# Patient Record
Sex: MALE | Race: WHITE | NOT HISPANIC OR LATINO | URBAN - METROPOLITAN AREA
[De-identification: names, ages, dates, MRNs, and addresses within clinical notes are randomized per-mention and may not be internally consistent; named-entity substitution may affect disease eponyms.]

---

## 2024-12-01 ENCOUNTER — OFFICE VISIT (OUTPATIENT)
Dept: URGENT CARE | Age: 29
End: 2024-12-01
Payer: COMMERCIAL

## 2024-12-01 VITALS
RESPIRATION RATE: 20 BRPM | WEIGHT: 140 LBS | HEART RATE: 60 BPM | OXYGEN SATURATION: 98 % | BODY MASS INDEX: 21.22 KG/M2 | TEMPERATURE: 98.6 F | DIASTOLIC BLOOD PRESSURE: 75 MMHG | SYSTOLIC BLOOD PRESSURE: 113 MMHG | HEIGHT: 68 IN

## 2024-12-01 DIAGNOSIS — S61.012A LACERATION OF LEFT THUMB WITHOUT FOREIGN BODY WITHOUT DAMAGE TO NAIL, INITIAL ENCOUNTER: Primary | ICD-10-CM

## 2024-12-01 ASSESSMENT — PAIN SCALES - GENERAL: PAINLEVEL_OUTOF10: 3

## 2024-12-01 NOTE — PATIENT INSTRUCTIONS
Laceration to the left thumb:  - Wound care discussed  - 3 sutures and 2 steri strips applied  - Pt advised on s/s of infection and when to seek emergent and urgent care again  - Pt to return to the Urgent Care in 7-10 days for suture removal

## 2024-12-01 NOTE — PROGRESS NOTES
"Subjective   Patient ID: Tim Cotter is a 29 y.o. male. They present today with a chief complaint of Injury (Left thumb laceration with knife today).    History of Present Illness  Patient here this evening secondary to cutting the tip of his left thumb with a knife while making dinner. Bleeding is not controlled at this time. Laceration is about 1.5 cm but varies in depth. Normal sensation; cap refill is less than 2 seconds. No other associated symptoms at this time.           Past Medical History  Allergies as of 12/01/2024 - never reviewed   Allergen Reaction Noted    Penicillins Unknown 08/23/2004       (Not in a hospital admission)       No past medical history on file.    No past surgical history on file.     reports that he has never smoked. He has never used smokeless tobacco.    Review of Systems  Review of Systems  10 point ROS completed and all are negative other than what is stated in the current HPI                               Objective    Vitals:    12/01/24 1813   BP: 113/75   BP Location: Left arm   Patient Position: Sitting   Pulse: 60   Resp: 20   Temp: 37 °C (98.6 °F)   TempSrc: Oral   SpO2: 98%   Weight: 63.5 kg (140 lb)   Height: 1.727 m (5' 8\")     No LMP for male patient.    Physical Exam  Vitals and nursing note reviewed.   Constitutional:       Appearance: Normal appearance.   Skin:     General: Skin is warm and dry.      Comments: Laceration to the left thumb, bleeding not controlled at this time (see procedure)  Normal sensation; cap refill <2sec   Neurological:      Mental Status: He is alert.         Laceration Repair    Date/Time: 12/1/2024 8:12 PM    Performed by: LORY Gustafson  Authorized by: LORY Gustafson    Consent:     Consent obtained:  Verbal    Consent given by:  Patient    Risks discussed:  Infection, pain and poor wound healing  Universal protocol:     Patient identity confirmed:  Verbally with patient  Anesthesia:     Anesthesia method:  " Local infiltration    Local anesthetic:  Lidocaine 1% WITH epi  Laceration details:     Location:  Finger    Finger location:  L thumb    Length (cm):  1 (total laceration is about 1.5 cm and curved; no nailbed invovlement laceration starts just below the tip of the nail)  Pre-procedure details:     Preparation:  Patient was prepped and draped in usual sterile fashion  Treatment:     Area cleansed with:  Chlorhexidine, saline and povidone-iodine    Amount of cleaning:  Standard  Skin repair:     Repair method:  Sutures    Suture size:  5-0    Suture material:  Prolene    Suture technique:  Simple interrupted    Number of sutures:  3  Approximation:     Approximation:  Close  Repair type:     Repair type:  Simple  Post-procedure details:     Dressing:  Antibiotic ointment and non-adherent dressing    Procedure completion:  Tolerated      Point of Care Test & Imaging Results from this visit  No results found for this visit on 12/01/24.   No results found.    Diagnostic study results (if any) were reviewed by LORY Gustafson.    Assessment/Plan   Allergies, medications, history, and pertinent labs/EKGs/Imaging reviewed by LORY Gustafson.     Medical Decision Making  Laceration to the left thumb:  - Wound care discussed  - 3 sutures and 2 steri strips applied  - Pt advised on s/s of infection and when to seek emergent and urgent care again  - Pt to return to the Urgent Care in 7-10 days for suture removal    Orders and Diagnoses  Diagnoses and all orders for this visit:  Laceration of left thumb without foreign body without damage to nail, initial encounter  Other orders  -     Tdap vaccine, age 7 years and older  (BOOSTRIX)  -     Laceration Repair      Medical Admin Record      Patient disposition: Home    Electronically signed by LORY Gustafson  8:18 PM

## 2024-12-08 ENCOUNTER — OFFICE VISIT (OUTPATIENT)
Dept: URGENT CARE | Age: 29
End: 2024-12-08
Payer: COMMERCIAL

## 2024-12-08 VITALS
BODY MASS INDEX: 21.29 KG/M2 | OXYGEN SATURATION: 99 % | HEART RATE: 61 BPM | RESPIRATION RATE: 16 BRPM | TEMPERATURE: 97.5 F | DIASTOLIC BLOOD PRESSURE: 70 MMHG | WEIGHT: 140 LBS | SYSTOLIC BLOOD PRESSURE: 108 MMHG

## 2024-12-08 DIAGNOSIS — S61.012D LACERATION OF LEFT THUMB WITHOUT FOREIGN BODY WITHOUT DAMAGE TO NAIL, SUBSEQUENT ENCOUNTER: ICD-10-CM

## 2024-12-08 DIAGNOSIS — Z48.02 VISIT FOR SUTURE REMOVAL: Primary | ICD-10-CM

## 2024-12-08 PROCEDURE — 1036F TOBACCO NON-USER: CPT

## 2024-12-08 PROCEDURE — 99213 OFFICE O/P EST LOW 20 MIN: CPT

## 2024-12-08 ASSESSMENT — ENCOUNTER SYMPTOMS
FEVER: 0
JOINT SWELLING: 0
CHILLS: 0
DIAPHORESIS: 0
WOUND: 1
FATIGUE: 0
COLOR CHANGE: 0

## 2024-12-09 NOTE — PROGRESS NOTES
Subjective   Patient ID: Tim Cotter is a 29 y.o. male. They present today with a chief complaint of Suture / Staple Removal.    History of Present Illness  Subjective  Tim Cotter is a 29 y.o. male who obtained a laceration 7 days ago, which required closure with 3 sutures. Mechanism of injury: knife. He denies pain, redness, or drainage from the wound. His last tetanus was 7 days ago.    Objective  /70   Pulse 61   Temp 36.4 °C (97.5 °F)   Resp 16   Wt 63.5 kg (140 lb)   SpO2 99%   BMI 21.29 kg/m²   Injury exam:  A 1.5 cm laceration noted on the left distal thumb is healing well, without evidence of infection.    Assessment/Plan  Laceration is healing well, without evidence of infection.    1. 3 sutures were removed.  2. Wound care discussed.  3. Follow up as needed.        History provided by:  Patient   used: No    Suture / Staple Removal         Past Medical History  Allergies as of 12/08/2024 - Reviewed 12/08/2024   Allergen Reaction Noted    Penicillins Unknown 08/23/2004       (Not in a hospital admission)       No past medical history on file.    No past surgical history on file.     reports that he has never smoked. He has never used smokeless tobacco.    Review of Systems  Review of Systems   Constitutional:  Negative for chills, diaphoresis, fatigue and fever.   Musculoskeletal:  Negative for joint swelling.   Skin:  Positive for wound. Negative for color change.                                  Objective    Vitals:    12/08/24 1847   BP: 108/70   Pulse: 61   Resp: 16   Temp: 36.4 °C (97.5 °F)   SpO2: 99%   Weight: 63.5 kg (140 lb)     No LMP for male patient.    Physical Exam  Vitals and nursing note reviewed.   Constitutional:       General: He is not in acute distress.     Appearance: Normal appearance. He is normal weight. He is not ill-appearing, toxic-appearing or diaphoretic.   Cardiovascular:      Rate and Rhythm: Normal rate.   Pulmonary:      Effort:  Pulmonary effort is normal.   Skin:     General: Skin is warm and dry.      Capillary Refill: Capillary refill takes less than 2 seconds.      Coloration: Skin is not jaundiced or pale.      Findings: No bruising or erythema.   Neurological:      General: No focal deficit present.      Mental Status: He is alert and oriented to person, place, and time.         Suture Removal    Date/Time: 12/8/2024 7:49 PM    Performed by: LORY Graves  Authorized by: LORY Graves    Consent:     Consent obtained:  Verbal    Consent given by:  Patient    Risks, benefits, and alternatives were discussed: yes      Risks discussed:  Bleeding, pain and wound separation  Universal protocol:     Procedure explained and questions answered to patient or proxy's satisfaction: yes      Patient identity confirmed:  Verbally with patient  Location:     Location:  Upper extremity    Upper extremity location:  Hand    Hand location:  L thumb  Procedure details:     Wound appearance:  No signs of infection, good wound healing and clean    Number of sutures removed:  3  Post-procedure details:     Post-removal:  No dressing applied    Procedure completion:  Tolerated      Point of Care Test & Imaging Results from this visit  No results found for this visit on 12/08/24.   No results found.    Diagnostic study results (if any) were reviewed by LORY Graves.    Assessment/Plan   Allergies, medications, history, and pertinent labs/EKGs/Imaging reviewed by LORY Graves.     Medical Decision Making    Patient at time of discharge was clinically well-appearing and HDS for outpatient management. The patient and/or family was given the opportunity to ask questions prior to discharge, understood my verbal discussion of the plans for treatment, expected course, indications to return to ED, and the need for timely follow up as directed.    Condition: Stable  Disposition:  Discharge    Orders and  Diagnoses  Diagnoses and all orders for this visit:  Visit for suture removal  Laceration of right thumb without foreign body without damage to nail, subsequent encounter      Medical Admin Record      Patient disposition: Home    Electronically signed by LORY Graves  7:49 PM